# Patient Record
Sex: FEMALE | Race: WHITE | NOT HISPANIC OR LATINO | Employment: UNEMPLOYED | URBAN - NONMETROPOLITAN AREA
[De-identification: names, ages, dates, MRNs, and addresses within clinical notes are randomized per-mention and may not be internally consistent; named-entity substitution may affect disease eponyms.]

---

## 2023-12-22 ENCOUNTER — APPOINTMENT (EMERGENCY)
Dept: CT IMAGING | Facility: HOSPITAL | Age: 38
End: 2023-12-22

## 2023-12-22 ENCOUNTER — HOSPITAL ENCOUNTER (EMERGENCY)
Facility: HOSPITAL | Age: 38
Discharge: HOME | End: 2023-12-22
Attending: EMERGENCY MEDICINE

## 2023-12-22 VITALS
RESPIRATION RATE: 16 BRPM | HEART RATE: 99 BPM | HEIGHT: 63 IN | WEIGHT: 155.65 LBS | DIASTOLIC BLOOD PRESSURE: 79 MMHG | BODY MASS INDEX: 27.58 KG/M2 | TEMPERATURE: 97.8 F | SYSTOLIC BLOOD PRESSURE: 130 MMHG | OXYGEN SATURATION: 99 %

## 2023-12-22 DIAGNOSIS — N61.0 CELLULITIS OF LEFT BREAST: Primary | ICD-10-CM

## 2023-12-22 LAB
ANION GAP SERPL CALCULATED.3IONS-SCNC: 9 MMOL/L
APTT PPP: 29 SECONDS (ref 23–37)
BASOPHILS # BLD AUTO: 0.03 THOUSANDS/ÂΜL (ref 0–0.1)
BASOPHILS NFR BLD AUTO: 0 % (ref 0–1)
BUN SERPL-MCNC: 16 MG/DL (ref 5–25)
CALCIUM SERPL-MCNC: 9.1 MG/DL (ref 8.4–10.2)
CHLORIDE SERPL-SCNC: 102 MMOL/L (ref 96–108)
CO2 SERPL-SCNC: 27 MMOL/L (ref 21–32)
CREAT SERPL-MCNC: 0.73 MG/DL (ref 0.6–1.3)
EOSINOPHIL # BLD AUTO: 0.05 THOUSAND/ÂΜL (ref 0–0.61)
EOSINOPHIL NFR BLD AUTO: 0 % (ref 0–6)
ERYTHROCYTE [DISTWIDTH] IN BLOOD BY AUTOMATED COUNT: 12.2 % (ref 11.6–15.1)
EXT PREGNANCY TEST URINE: NEGATIVE
EXT. CONTROL: NORMAL
GFR SERPL CREATININE-BSD FRML MDRD: 104 ML/MIN/1.73SQ M
GLUCOSE SERPL-MCNC: 113 MG/DL (ref 65–140)
HCT VFR BLD AUTO: 43.1 % (ref 34.8–46.1)
HGB BLD-MCNC: 13.6 G/DL (ref 11.5–15.4)
IMM GRANULOCYTES # BLD AUTO: 0.08 THOUSAND/UL (ref 0–0.2)
IMM GRANULOCYTES NFR BLD AUTO: 1 % (ref 0–2)
INR PPP: 1 (ref 0.84–1.19)
LACTATE SERPL-SCNC: 0.9 MMOL/L (ref 0.5–2)
LYMPHOCYTES # BLD AUTO: 1.75 THOUSANDS/ÂΜL (ref 0.6–4.47)
LYMPHOCYTES NFR BLD AUTO: 10 % (ref 14–44)
MCH RBC QN AUTO: 29.3 PG (ref 26.8–34.3)
MCHC RBC AUTO-ENTMCNC: 31.6 G/DL (ref 31.4–37.4)
MCV RBC AUTO: 93 FL (ref 82–98)
MONOCYTES # BLD AUTO: 1.13 THOUSAND/ÂΜL (ref 0.17–1.22)
MONOCYTES NFR BLD AUTO: 7 % (ref 4–12)
NEUTROPHILS # BLD AUTO: 13.71 THOUSANDS/ÂΜL (ref 1.85–7.62)
NEUTS SEG NFR BLD AUTO: 82 % (ref 43–75)
NRBC BLD AUTO-RTO: 0 /100 WBCS
PLATELET # BLD AUTO: 332 THOUSANDS/UL (ref 149–390)
PMV BLD AUTO: 10.2 FL (ref 8.9–12.7)
POTASSIUM SERPL-SCNC: 3.3 MMOL/L (ref 3.5–5.3)
PROCALCITONIN SERPL-MCNC: <0.05 NG/ML
PROTHROMBIN TIME: 13.1 SECONDS (ref 11.6–14.5)
RBC # BLD AUTO: 4.64 MILLION/UL (ref 3.81–5.12)
SODIUM SERPL-SCNC: 138 MMOL/L (ref 135–147)
WBC # BLD AUTO: 16.75 THOUSAND/UL (ref 4.31–10.16)

## 2023-12-22 PROCEDURE — 81025 URINE PREGNANCY TEST: CPT

## 2023-12-22 PROCEDURE — 36415 COLL VENOUS BLD VENIPUNCTURE: CPT

## 2023-12-22 PROCEDURE — 99285 EMERGENCY DEPT VISIT HI MDM: CPT | Performed by: EMERGENCY MEDICINE

## 2023-12-22 PROCEDURE — G1004 CDSM NDSC: HCPCS

## 2023-12-22 PROCEDURE — 87040 BLOOD CULTURE FOR BACTERIA: CPT

## 2023-12-22 PROCEDURE — 96375 TX/PRO/DX INJ NEW DRUG ADDON: CPT

## 2023-12-22 PROCEDURE — 85730 THROMBOPLASTIN TIME PARTIAL: CPT

## 2023-12-22 PROCEDURE — 99283 EMERGENCY DEPT VISIT LOW MDM: CPT

## 2023-12-22 PROCEDURE — 83605 ASSAY OF LACTIC ACID: CPT

## 2023-12-22 PROCEDURE — 80048 BASIC METABOLIC PNL TOTAL CA: CPT

## 2023-12-22 PROCEDURE — 84145 PROCALCITONIN (PCT): CPT

## 2023-12-22 PROCEDURE — 96365 THER/PROPH/DIAG IV INF INIT: CPT

## 2023-12-22 PROCEDURE — 71260 CT THORAX DX C+: CPT

## 2023-12-22 PROCEDURE — 85610 PROTHROMBIN TIME: CPT

## 2023-12-22 PROCEDURE — 85025 COMPLETE CBC W/AUTO DIFF WBC: CPT

## 2023-12-22 RX ORDER — ACETAMINOPHEN 325 MG/1
975 TABLET ORAL ONCE
Status: COMPLETED | OUTPATIENT
Start: 2023-12-22 | End: 2023-12-22

## 2023-12-22 RX ORDER — ONDANSETRON 4 MG/1
4 TABLET, ORALLY DISINTEGRATING ORAL ONCE
Status: DISCONTINUED | OUTPATIENT
Start: 2023-12-22 | End: 2023-12-23 | Stop reason: HOSPADM

## 2023-12-22 RX ORDER — SULFAMETHOXAZOLE AND TRIMETHOPRIM 800; 160 MG/1; MG/1
1 TABLET ORAL 2 TIMES DAILY
Qty: 14 TABLET | Refills: 0 | Status: SHIPPED | OUTPATIENT
Start: 2023-12-23 | End: 2023-12-30

## 2023-12-22 RX ORDER — POTASSIUM CHLORIDE 20 MEQ/1
20 TABLET, EXTENDED RELEASE ORAL ONCE
Status: COMPLETED | OUTPATIENT
Start: 2023-12-22 | End: 2023-12-22

## 2023-12-22 RX ORDER — CEPHALEXIN 500 MG/1
500 CAPSULE ORAL EVERY 6 HOURS SCHEDULED
Qty: 28 CAPSULE | Refills: 0 | Status: SHIPPED | OUTPATIENT
Start: 2023-12-23 | End: 2023-12-30

## 2023-12-22 RX ORDER — KETOROLAC TROMETHAMINE 30 MG/ML
15 INJECTION, SOLUTION INTRAMUSCULAR; INTRAVENOUS ONCE
Status: COMPLETED | OUTPATIENT
Start: 2023-12-22 | End: 2023-12-22

## 2023-12-22 RX ADMIN — KETOROLAC TROMETHAMINE 15 MG: 30 INJECTION, SOLUTION INTRAMUSCULAR; INTRAVENOUS at 19:58

## 2023-12-22 RX ADMIN — ACETAMINOPHEN 975 MG: 325 TABLET, FILM COATED ORAL at 19:57

## 2023-12-22 RX ADMIN — IOHEXOL 85 ML: 350 INJECTION, SOLUTION INTRAVENOUS at 21:44

## 2023-12-22 RX ADMIN — VANCOMYCIN HYDROCHLORIDE 1500 MG: 1 INJECTION, POWDER, LYOPHILIZED, FOR SOLUTION INTRAVENOUS at 21:49

## 2023-12-22 RX ADMIN — POTASSIUM CHLORIDE 20 MEQ: 1500 TABLET, EXTENDED RELEASE ORAL at 21:51

## 2023-12-22 RX ADMIN — SODIUM CHLORIDE 500 ML: 0.9 INJECTION, SOLUTION INTRAVENOUS at 21:49

## 2023-12-23 NOTE — SEPSIS NOTE
Sepsis Note   Loraine Hamilton 38 y.o. female MRN: [id number]  Unit/Bed#: RM24 Encounter: [id number]       Initial Sepsis Screening       Row Name 12/22/23 2124                Is the patient's history suggestive of a new or worsening infection? Yes (Proceed)  -MR        Suspected source of infection soft tissue  -MR        Indicate SIRS criteria Tachycardia > 90 bpm;Leukocytosis (WBC > 20017 IJL) OR Leukopenia (WBC <4000 IJL) OR Bandemia (WBC >10% bands)  -MR        Are two or more of the above signs & symptoms of infection both present and new to the patient? Yes (Proceed)  -MR        Assess for evidence of organ dysfunction: Are any of the below criteria present within 6 hours of suspected infection and SIRS criteria that are NOT considered to be chronic conditions? --                  User Key  (r) = Recorded By, (t) = Taken By, (c) = Cosigned By      Initials Name Provider Type    MR Fatou Murray MD Resident                        Body mass index is 27.57 kg/m².  Wt Readings from Last 1 Encounters:   12/22/23 70.6 kg (155 lb 10.3 oz)     IBW (Ideal Body Weight): 52.4 kg    Ideal body weight: 52.4 kg (115 lb 8.3 oz)  Adjusted ideal body weight: 59.7 kg (131 lb 9.1 oz)

## 2023-12-23 NOTE — ED ATTENDING ATTESTATION
12/22/2023  I, Malena Dickerson MD, saw and evaluated the patient. I have discussed the patient with the resident/non-physician practitioner and agree with the resident's/non-physician practitioner's findings, Plan of Care, and MDM as documented in the resident's/non-physician practitioner's note, except where noted. All available labs and Radiology studies were reviewed.  I was present for key portions of any procedure(s) performed by the resident/non-physician practitioner and I was immediately available to provide assistance.       At this point I agree with the current assessment done in the Emergency Department.  I have conducted an independent evaluation of this patient a history and physical is as follows:    38-year-old female with no significant past medical history presents for evaluation of left-sided breast pain.  Patient states she feels like she got bit by something 5 days ago.  She started to notice redness and swelling to the area over the last few days.  She states at 1 point, she pushed on the area of swelling and had some purulent discharge from her nipple.  Patient states she has had some chills but no fevers.  Denies chest pain or shortness of breath.  Denies abdominal pain, nausea, vomiting, or diarrhea.    Physical exam:  Vital signs reviewed, she is mildly tachycardic, afebrile.   Patient is awake and alert, no acute distress, head normocephalic, atraumatic, mucous membranes moist, neck supple, heart tachycardic rate, regular rhythm, lungs clear to auscultation bilaterally, patient has redness and swelling to the left breast, tender to palpation, no discharge from the nipple at this time.  Abdomen soft, nontender, nondistended, no peripheral edema, no focal neurologic deficits.    Assessment/plan:  38-year-old female with no significant past medical history presents for evaluation of left-sided breast pain for the past few days, patient does have redness and tenderness to the outside of the  left breast, no fevers but she is mildly tachycardic.  Concern for cellulitis versus breast abscess.  Will obtain CBC to evaluate for leukocytosis, BMP to evaluate renal function, CT chest with IV contrast to evaluate for abscess.  Will treat symptomatically and reassess.    ED Course     Reviewed labs, patient has leukocytosis, blood cultures, Pro-Serge, and lactate.  Lactate negative, Pro-Serge normal.  BMP without marked abnormalities.  CT scan shows consistent findings with cellulitis but no abscess in the past.  Discussed with patient, will prescribe Bactrim and Keflex for 7 days for breast cellulitis.  Discussed return precautions.  Will have patient follow-up with general surgery.    Critical Care Time  Procedures

## 2023-12-23 NOTE — DISCHARGE INSTRUCTIONS
You are being given 2 antibiotics:  Bactrim, which you will take twice daily for 7 days  Keflex, which you will take 4 times a day for 7 days

## 2023-12-23 NOTE — ED PROVIDER NOTES
"History  Chief Complaint   Patient presents with    Breast Problem     Pt presents with L breast pain, swelling, redness since Monday, pain is now spreading up to neck and down to hip, pt also reports greenish discharge from nipple on Wednesday     HPI 38-year-old female presents emerged department for evaluation of left breast pain, redness and swelling x 5 days.  States she woke up suddenly after she felt like she was \"stung by bee\" in middle of the night while she was sleeping, when she examined her breast, she noticed 2 small puncture marks.  Over the course of the last 5 days, there has been increasing swelling, tenderness, erythema.  Also states she was able to express a small amount of yellow-green purulence from her nipple.  Last dose of pain and antipyretic (Aleve) was yesterday.  She denies any fevers, does endorse some chills today.  No history of breast cancer.  She is not breast-feeding.  She does have a nipple ring, however this piercing was done over 15 years ago.      History reviewed. No pertinent past medical history.    History reviewed. No pertinent surgical history.    History reviewed. No pertinent family history.  I have reviewed and agree with the history as documented.    E-Cigarette/Vaping    E-Cigarette Use Never User      E-Cigarette/Vaping Substances     Social History     Tobacco Use    Smoking status: Every Day     Current packs/day: 0.50     Average packs/day: 0.5 packs/day for 18.0 years (9.0 ttl pk-yrs)     Types: Cigarettes     Start date: 12/22/2005    Smokeless tobacco: Never   Vaping Use    Vaping status: Never Used   Substance Use Topics    Alcohol use: Yes     Alcohol/week: 1.0 standard drink of alcohol     Types: 1 Shots of liquor per week    Drug use: Never        Review of Systems   Constitutional:  Positive for chills. Negative for fever.   HENT:  Negative for ear pain and sore throat.    Eyes:  Negative for visual disturbance.   Respiratory:  Negative for cough and " shortness of breath.    Cardiovascular:  Negative for chest pain and leg swelling.   Gastrointestinal:  Negative for abdominal pain, blood in stool, constipation, diarrhea, nausea and vomiting.   Genitourinary:  Negative for dysuria and hematuria.   Musculoskeletal:  Negative for neck pain and neck stiffness.   Skin:         Left breast swelling, redness, warmth, pain   Neurological:  Negative for dizziness, syncope, weakness and light-headedness.   All other systems reviewed and are negative.      Physical Exam  ED Triage Vitals [12/22/23 1916]   Temperature Pulse Respirations Blood Pressure SpO2   97.8 °F (36.6 °C) (!) 111 18 141/77 100 %      Temp Source Heart Rate Source Patient Position - Orthostatic VS BP Location FiO2 (%)   Tympanic Monitor Lying Right arm --      Pain Score       8             Orthostatic Vital Signs  Vitals:    12/22/23 1916 12/22/23 2000   BP: 141/77 130/79   Pulse: (!) 111 99   Patient Position - Orthostatic VS: Lying Sitting       Physical Exam  Vitals and nursing note reviewed.   Constitutional:       General: She is not in acute distress.     Appearance: She is well-developed. She is not ill-appearing, toxic-appearing or diaphoretic.   HENT:      Head: Normocephalic and atraumatic.   Eyes:      Conjunctiva/sclera: Conjunctivae normal.   Cardiovascular:      Rate and Rhythm: Regular rhythm. Tachycardia present.      Pulses: Normal pulses.      Heart sounds: Normal heart sounds. No murmur heard.  Pulmonary:      Effort: Pulmonary effort is normal. No respiratory distress.      Breath sounds: Normal breath sounds. No stridor. No wheezing, rhonchi or rales.   Chest:      Chest wall: No tenderness.   Breasts:     Left: Swelling, skin change and tenderness present.       Abdominal:      Palpations: Abdomen is soft.      Tenderness: There is no abdominal tenderness.   Musculoskeletal:         General: Tenderness present. No swelling.      Cervical back: Neck supple.   Lymphadenopathy:       Upper Body:      Left upper body: No supraclavicular, axillary or pectoral adenopathy.   Skin:     General: Skin is warm and dry.      Capillary Refill: Capillary refill takes less than 2 seconds.      Coloration: Skin is not jaundiced or pale.      Findings: Erythema (left breast) and rash (left breast) present. No bruising or lesion.   Neurological:      Mental Status: She is alert and oriented to person, place, and time.   Psychiatric:         Mood and Affect: Mood normal.         ED Medications  Medications   vancomycin (VANCOCIN) 1500 mg in sodium chloride 0.9% 250 mL IVPB (1,500 mg Intravenous New Bag 12/22/23 2149)   ondansetron (ZOFRAN-ODT) dispersible tablet 4 mg (4 mg Oral Not Given 12/22/23 2152)   ketorolac (TORADOL) injection 15 mg (15 mg Intravenous Given 12/22/23 1958)   acetaminophen (TYLENOL) tablet 975 mg (975 mg Oral Given 12/22/23 1957)   sodium chloride 0.9 % bolus 500 mL (500 mL Intravenous New Bag 12/22/23 2149)   potassium chloride (K-DUR,KLOR-CON) CR tablet 20 mEq (20 mEq Oral Given 12/22/23 2151)   iohexol (OMNIPAQUE) 350 MG/ML injection (MULTI-DOSE) 85 mL (85 mL Intravenous Given 12/22/23 2144)       Diagnostic Studies  Results Reviewed       Procedure Component Value Units Date/Time    Procalcitonin [[id number]]  (Normal) Collected: 12/22/23 2044    Lab Status: Final result Specimen: Blood from Arm, Right Updated: 12/22/23 2133     Procalcitonin <0.05 ng/ml     Basic metabolic panel [[id number]]  (Abnormal) Collected: 12/22/23 2044    Lab Status: Final result Specimen: Blood from Arm, Left Updated: 12/22/23 2125     Sodium 138 mmol/L      Potassium 3.3 mmol/L      Chloride 102 mmol/L      CO2 27 mmol/L      ANION GAP 9 mmol/L      BUN 16 mg/dL      Creatinine 0.73 mg/dL      Glucose 113 mg/dL      Calcium 9.1 mg/dL      eGFR 104 ml/min/1.73sq m     Narrative:      [employer] guidelines for Chronic Kidney Disease (CKD):     Stage 1 with normal or high GFR (GFR > 90  mL/min/1.73 square meters)    Stage 2 Mild CKD (GFR = 60-89 mL/min/1.73 square meters)    Stage 3A Moderate CKD (GFR = 45-59 mL/min/1.73 square meters)    Stage 3B Moderate CKD (GFR = 30-44 mL/min/1.73 square meters)    Stage 4 Severe CKD (GFR = 15-29 mL/min/1.73 square meters)    Stage 5 End Stage CKD (GFR <15 mL/min/1.73 square meters)  Note: GFR calculation is accurate only with a steady state creatinine    Lactic acid, plasma (w/reflex if result > 2.0) [[id number]]  (Normal) Collected: 12/22/23 2044    Lab Status: Final result Specimen: Blood from Arm, Right Updated: 12/22/23 2124     LACTIC ACID 0.9 mmol/L     Narrative:      Result may be elevated if tourniquet was used during collection.    Protime-INR [[id number]]  (Normal) Collected: 12/22/23 2044    Lab Status: Final result Specimen: Blood from Arm, Right Updated: 12/22/23 2109     Protime 13.1 seconds      INR 1.00    APTT [[id number]]  (Normal) Collected: 12/22/23 2044    Lab Status: Final result Specimen: Blood from Arm, Right Updated: 12/22/23 2109     PTT 29 seconds     Blood culture #2 [[id number]] Collected: 12/22/23 2044    Lab Status: In process Specimen: Blood from Hand, Right Updated: 12/22/23 2100    Blood culture #1 [[id number]] Collected: 12/22/23 2044    Lab Status: In process Specimen: Blood from Arm, Right Updated: 12/22/23 2100    POCT pregnancy, urine [[id number]]  (Normal) Resulted: 12/22/23 2009    Lab Status: Final result Updated: 12/22/23 2009     EXT Preg Test, Ur Negative     Control Valid    CBC and differential [[id number]]  (Abnormal) Collected: 12/22/23 1956    Lab Status: Final result Specimen: Blood from Arm, Left Updated: 12/22/23 2009     WBC 16.75 Thousand/uL      RBC 4.64 Million/uL      Hemoglobin 13.6 g/dL      Hematocrit 43.1 %      MCV 93 fL      MCH 29.3 pg      MCHC 31.6 g/dL      RDW 12.2 %      MPV 10.2 fL      Platelets 332 Thousands/uL      nRBC 0 /100 WBCs      Neutrophils Relative 82 %      Immat GRANS % 1 %       Lymphocytes Relative 10 %      Monocytes Relative 7 %      Eosinophils Relative 0 %      Basophils Relative 0 %      Neutrophils Absolute 13.71 Thousands/µL      Immature Grans Absolute 0.08 Thousand/uL      Lymphocytes Absolute 1.75 Thousands/µL      Monocytes Absolute 1.13 Thousand/µL      Eosinophils Absolute 0.05 Thousand/µL      Basophils Absolute 0.03 Thousands/µL                    CT chest with contrast   Final Result by Brooke Narayanan MD (12/22 2223)      Mild thickening of the skin of the left breast lateral to the left nipple compatible with cellulitis.      No left breast abscess or mass.      A few minimally enlarged left axillary nodes, reactive.         Workstation performed: [id number]               Procedures  Procedures      ED Course  ED Course as of 12/22/23 2240   Fri Dec 22, 2023   2014 PREGNANCY TEST URINE: Negative  negative   2014 WBC(!): 16.75  Will proceed with full sepsis labs given leukocytosis and tachycardia   2014 Neutrophils %(!): 82   2014 Absolute Neutrophils(!): 13.71   2124 POCT INR: 1.00  wnl   2124 LACTIC ACID: 0.9  wnl   2126 Potassium(!): 3.3  Will replace with oral K   2137 Procalcitonin: <0.05  wnl                          Initial Sepsis Screening       Row Name 12/22/23 2124                Is the patient's history suggestive of a new or worsening infection? Yes (Proceed)  -MR        Suspected source of infection soft tissue  -MR        Indicate SIRS criteria Tachycardia > 90 bpm;Leukocytosis (WBC > 30870 IJL) OR Leukopenia (WBC <4000 IJL) OR Bandemia (WBC >10% bands)  -MR        Are two or more of the above signs & symptoms of infection both present and new to the patient? Yes (Proceed)  -MR        Assess for evidence of organ dysfunction: Are any of the below criteria present within 6 hours of suspected infection and SIRS criteria that are NOT considered to be chronic conditions? --                  User Key  (r) = Recorded By, (t) = Taken By, (c) = Cosigned By       Initials Name Provider Type    MR Lainez Bobbi Murray MD Resident                    SBIRT 22yo+      Flowsheet Row Most Recent Value   Initial Alcohol Screen: US AUDIT-C     1. How often do you have a drink containing alcohol? 0 Filed at: 12/22/2023 1922   2. How many drinks containing alcohol do you have on a typical day you are drinking?  0 Filed at: 12/22/2023 1922   3a. Male UNDER 65: How often do you have five or more drinks on one occasion? 0 Filed at: 12/22/2023 1922   3b. FEMALE Any Age, or MALE 65+: How often do you have 4 or more drinks on one occassion? 0 Filed at: 12/22/2023 1922   Audit-C Score 0 Filed at: 12/22/2023 1922   ORION: How many times in the past year have you...    Used an illegal drug or used a prescription medication for non-medical reasons? Never Filed at: 12/22/2023 1922                  Medical Decision Making  38-year-old female presents emerged department for evaluation of left breast pain, redness and swelling x 5 days.    Differential diagnosis includes but is not limited to: Cellulitis, breast abscess, breast mass    Here in the ED, the patient is hemodynamically stable and afebrile, mildly tachycardic to 111, with normal work of breathing satting 99% on room air.  She is alert and oriented x 3, speaking in full sentences.  She is not acute distress.  She is nontoxic-appearing.  Her exam is notable for a cellulitic appearing rash over the left breast, with palpable nodule that is extremely tender to palpation.    Given the patient reporting some chills, and has tachycardia here, did send a full septic workup, which only returned significant for a leukocytosis with a left shift but no bandemia.  Lactic within normal limits.    CT of the chest with contrast demonstrates cellulitic changes in the tissues of the left breast, without evidence of abscess or mass.  The patient is started on antibiotics, given a first dose here in the department.  She is discharged home on both Keflex as  well as Bactrim.  She has no PCP listed on file.  She has given follow-up referral with general surgery.  Discussion of strict return precautions and discussion of critical importance of finishing antibiotics to completion.  Patient discharged home in stable condition.    Amount and/or Complexity of Data Reviewed  Labs: ordered. Decision-making details documented in ED Course.  Radiology: ordered.    Risk  OTC drugs.  Prescription drug management.          Disposition  Final diagnoses:   Cellulitis of left breast     Time reflects when diagnosis was documented in both MDM as applicable and the Disposition within this note       Time User Action Codes Description Comment    12/22/2023 10:25 PM Trevor Fatou Martines Add [N61.0] Cellulitis of left breast           ED Disposition       ED Disposition   Discharge    Condition   Stable    Date/Time   Fri Dec 22, 2023 2226    Comment   Loraine Hamilton discharge to home/self care.                   Follow-up Information       Follow up With Specialties Details Why Contact Info Additional Information    [facility] Schedule an appointment as soon as possible for a visit   [address] [place] [id number] [phone] [facility], [address] [place] [address], [id number]   [phone]            Patient's Medications   Discharge Prescriptions    CEPHALEXIN (KEFLEX) 500 MG CAPSULE    Take 1 capsule (500 mg total) by mouth every 6 (six) hours for 7 days Do not start before December 23, 2023.       Start Date: 12/23/2023End Date: 12/30/2023       Order Dose: 500 mg       Quantity: 28 capsule    Refills: 0    SULFAMETHOXAZOLE-TRIMETHOPRIM (BACTRIM DS) 800-160 MG PER TABLET    Take 1 tablet by mouth 2 (two) times a day for 7 days smx-tmp DS (BACTRIM) 800-160 mg tabs (1tab q12 D10) Do not start before December 23, 2023.       Start Date: 12/23/2023End Date: 12/30/2023       Order Dose: 1 tablet       Quantity:  14 tablet    Refills: 0         PDMP Review       None             ED Provider  Attending physically available and evaluated Loraine Hamilton. I managed the patient along with the ED Attending.    Electronically Signed by           Fatou Murray MD  12/22/23 5494

## 2023-12-24 LAB
BACTERIA BLD CULT: NORMAL
BACTERIA BLD CULT: NORMAL

## 2023-12-28 LAB
BACTERIA BLD CULT: NORMAL
BACTERIA BLD CULT: NORMAL

## 2024-01-12 ENCOUNTER — CONSULT (OUTPATIENT)
Dept: SURGERY | Facility: CLINIC | Age: 39
End: 2024-01-12

## 2024-01-12 VITALS
TEMPERATURE: 98.6 F | OXYGEN SATURATION: 98 % | WEIGHT: 154.6 LBS | HEIGHT: 63 IN | HEART RATE: 100 BPM | BODY MASS INDEX: 27.39 KG/M2 | SYSTOLIC BLOOD PRESSURE: 142 MMHG | DIASTOLIC BLOOD PRESSURE: 78 MMHG

## 2024-01-12 DIAGNOSIS — N61.0 CELLULITIS OF LEFT BREAST: Primary | ICD-10-CM

## 2024-01-12 PROCEDURE — 99203 OFFICE O/P NEW LOW 30 MIN: CPT | Performed by: SURGERY

## 2024-01-13 PROBLEM — N61.0 CELLULITIS OF LEFT BREAST: Status: ACTIVE | Noted: 2024-01-13

## 2024-01-13 NOTE — PROGRESS NOTES
Assessment/Plan:    Cellulitis of left breast  Bilateral breast examination benign.  Left breast region of previous cellulitis based on pictures now resolved.  Minimal induration around the 3 o'clock position at the nipple areolar complex.  No drainage from the nipple was expressible.  Bilateral nipple piercings in place.  No axillary adenopathy.  Left breast with no significant edema and appears back to normal size as per patient.    Unclear etiology of cellulitis as patient thinks she may have been bitten by something.  Recent CT imaging showing no obvious abscess collection.  Patient is 38 and there is significant history of breast cancer in the family.  However unclear etiology of the cellulitis and what patient describes as a previous lump.  Will consider potential ultrasound and mammogram.  Patient currently does not have insurance and is working on getting needed.  Patient is to call us when she does obtain insurance so we can discuss potential mammogram and ultrasound.     Diagnoses and all orders for this visit:    Cellulitis of left breast          Notes:    ER note dated December 22, 2023 was personally viewed by me.    Imaging:    CT scan of the chest dated December 22, 2023 was personally reviewed by me and discussed with the patient in the office.    Blood work/laboratory:    Blood work dated December 22, 2023 including procalcitonin, lactic acid, BMP, CBC, coagulation studies were all personally reviewed by me.    Subjective:      Patient ID: Loraine Hamilton is a 38 y.o. female.    38-year-old female with no significant past medical history presents today in consultation for evaluation of left breast cellulitis.  Patient was seen in the ER on December 22 for complaints of left breast swelling and pain.  At that time she was diagnosed with a left breast cellulitis.  CT scan was undertaken which did not show any evidence of drainable collection.  She was treated with Keflex and Bactrim.  Patient currently  "not breast-feeding.  She does have a history of bilateral nipple piercings which were done greater than 10 years ago.  She states she has had infection in the past of the right breast due to the nipple piercing which she is removed and this has not replaced and there is no issue.  Today she states that the left breast is back to normal size.  There is no redness.  No pain.  No fevers or chills.  Overall she states she is just feeling very well on just, and tired but denies any complaints of the left breast.  No history or breast cancer nor breast cancer in the family.  She does describe a palpable lump near the nipple during the inflammation but that is all basically resolved.  Denies any significant drainage at this time.        The following portions of the patient's history were reviewed and updated as appropriate: She  has no past medical history on file.  She   Patient Active Problem List    Diagnosis Date Noted    Cellulitis of left breast 01/13/2024     She  has no past surgical history on file.  Her family history is not on file.  She  reports that she has been smoking cigarettes. She started smoking about 18 years ago. She has a 9.0 pack-year smoking history. She has never used smokeless tobacco. She reports current alcohol use of about 1.0 standard drink of alcohol per week. She reports that she does not use drugs.  No current outpatient medications on file.     No current facility-administered medications for this visit.     She is allergic to doxycycline..    Review of Systems      Review of systems completed, all negative except as noted above HPI.    Objective:      /78 (BP Location: Left arm, Patient Position: Sitting, Cuff Size: Standard)   Pulse 100   Temp 98.6 °F (37 °C) (Temporal)   Ht 5' 3\" (1.6 m)   Wt 70.1 kg (154 lb 9.6 oz)   LMP 12/11/2023 (Exact Date)   SpO2 98%   BMI 27.39 kg/m²          Physical Exam  Vitals reviewed. Exam conducted with a chaperone present.   Constitutional:  "      General: She is not in acute distress.     Appearance: She is not ill-appearing, toxic-appearing or diaphoretic.   HENT:      Head: Normocephalic and atraumatic.      Right Ear: External ear normal.      Left Ear: External ear normal.   Eyes:      General: No scleral icterus.        Right eye: No discharge.         Left eye: No discharge.   Cardiovascular:      Rate and Rhythm: Normal rate and regular rhythm.      Heart sounds: Normal heart sounds.      No gallop.   Pulmonary:      Effort: Pulmonary effort is normal. No respiratory distress.      Breath sounds: Normal breath sounds. No stridor. No wheezing or rhonchi.   Chest:      Chest wall: No mass, lacerations, deformity, swelling, tenderness or edema.   Breasts:     Right: Normal. No swelling, bleeding, inverted nipple, mass, nipple discharge, skin change or tenderness.      Left: Normal. No swelling, bleeding, inverted nipple, mass, nipple discharge, skin change or tenderness.      Comments: Left breast without any evidence of erythema to suggest ongoing cellulitis.  Mild induration deep to palpation compared to the left breast.  No palpable mass though.  No nipple drainage.  Bilateral nipple piercings in place.  Musculoskeletal:         General: No swelling or deformity. Normal range of motion.      Cervical back: Normal range of motion.      Right lower leg: No edema.      Left lower leg: No edema.   Lymphadenopathy:      Upper Body:      Right upper body: No axillary adenopathy.      Left upper body: No axillary adenopathy.   Skin:     General: Skin is warm.      Coloration: Skin is not jaundiced or pale.      Findings: No bruising or erythema.      Comments: Breast with no evidence of cellulitis.  Mild palpable induration approximately 3:00 on the border of the nipple areolar complex   Neurological:      General: No focal deficit present.      Mental Status: She is alert and oriented to person, place, and time.      Cranial Nerves: No cranial nerve  deficit.   Psychiatric:         Mood and Affect: Mood normal.         Thought Content: Thought content normal.         Judgment: Judgment normal.

## 2024-01-13 NOTE — ASSESSMENT & PLAN NOTE
Bilateral breast examination benign.  Left breast region of previous cellulitis based on pictures now resolved.  Minimal induration around the 3 o'clock position at the nipple areolar complex.  No drainage from the nipple was expressible.  Bilateral nipple piercings in place.  No axillary adenopathy.  Left breast with no significant edema and appears back to normal size as per patient.    Unclear etiology of cellulitis as patient thinks she may have been bitten by something.  Recent CT imaging showing no obvious abscess collection.  Patient is 38 and there is significant history of breast cancer in the family.  However unclear etiology of the cellulitis and what patient describes as a previous lump.  Will consider potential ultrasound and mammogram.  Patient currently does not have insurance and is working on getting needed.  Patient is to call us when she does obtain insurance so we can discuss potential mammogram and ultrasound.